# Patient Record
Sex: MALE | Race: WHITE | Employment: FULL TIME | URBAN - METROPOLITAN AREA
[De-identification: names, ages, dates, MRNs, and addresses within clinical notes are randomized per-mention and may not be internally consistent; named-entity substitution may affect disease eponyms.]

---

## 2020-11-29 ENCOUNTER — APPOINTMENT (OUTPATIENT)
Dept: GENERAL RADIOLOGY | Age: 40
End: 2020-11-29
Attending: EMERGENCY MEDICINE

## 2020-11-29 ENCOUNTER — HOSPITAL ENCOUNTER (EMERGENCY)
Age: 40
Discharge: HOME | End: 2020-11-29
Attending: EMERGENCY MEDICINE

## 2020-11-29 VITALS
SYSTOLIC BLOOD PRESSURE: 154 MMHG | OXYGEN SATURATION: 100 % | TEMPERATURE: 97.7 F | RESPIRATION RATE: 18 BRPM | DIASTOLIC BLOOD PRESSURE: 78 MMHG | HEIGHT: 67 IN | WEIGHT: 135 LBS | HEART RATE: 60 BPM | BODY MASS INDEX: 21.19 KG/M2

## 2020-11-29 DIAGNOSIS — S61.214A LACERATION OF RIGHT RING FINGER WITHOUT FOREIGN BODY WITHOUT DAMAGE TO NAIL, INITIAL ENCOUNTER: Primary | ICD-10-CM

## 2020-11-29 PROCEDURE — 74011000250 HC RX REV CODE- 250: Performed by: EMERGENCY MEDICINE

## 2020-11-29 PROCEDURE — 99283 EMERGENCY DEPT VISIT LOW MDM: CPT

## 2020-11-29 PROCEDURE — 73140 X-RAY EXAM OF FINGER(S): CPT

## 2020-11-29 PROCEDURE — 90715 TDAP VACCINE 7 YRS/> IM: CPT | Performed by: EMERGENCY MEDICINE

## 2020-11-29 PROCEDURE — 74011250636 HC RX REV CODE- 250/636: Performed by: EMERGENCY MEDICINE

## 2020-11-29 PROCEDURE — 75810000293 HC SIMP/SUPERF WND  RPR

## 2020-11-29 RX ORDER — BACITRACIN 500 UNIT/G
1 PACKET (EA) TOPICAL ONCE
Status: COMPLETED | OUTPATIENT
Start: 2020-11-29 | End: 2020-11-29

## 2020-11-29 RX ORDER — LIDOCAINE HYDROCHLORIDE 10 MG/ML
10 INJECTION INFILTRATION; PERINEURAL ONCE
Status: COMPLETED | OUTPATIENT
Start: 2020-11-29 | End: 2020-11-29

## 2020-11-29 RX ADMIN — BACITRACIN 1 PACKET: 500 OINTMENT TOPICAL at 18:24

## 2020-11-29 RX ADMIN — LIDOCAINE HYDROCHLORIDE 10 ML: 10 INJECTION, SOLUTION INFILTRATION; PERINEURAL at 16:49

## 2020-11-29 RX ADMIN — TETANUS TOXOID, REDUCED DIPHTHERIA TOXOID AND ACELLULAR PERTUSSIS VACCINE, ADSORBED 0.5 ML: 5; 2.5; 8; 8; 2.5 SUSPENSION INTRAMUSCULAR at 16:49

## 2020-11-29 NOTE — LETTER
[address] [place] [address] 
[phone] Work/School Note Date: 11/29/2020 To Whom It May concern: 
 
Marveen Brunner III was seen and treated today in the emergency room by the following provider(s): 
Attending Provider: Callie Yanes MD.   
 
Marveen Brunner III return to work 12-3-2020 Sincerely, 
 
 
 
 
Jesenia Bacon RN

## 2020-11-29 NOTE — ED TRIAGE NOTES
Occurred 20 minutes ago, cutting wood with skill saw, cut right tip of 4th and 3rd finger, no bleeding at present, stated tetanus out of date greater than 5 years. Pt can feel and move fingers.

## 2020-11-29 NOTE — ED PROVIDER NOTES
EMERGENCY DEPARTMENT HISTORY AND PHYSICAL EXAM      Date: 11/29/2020  Patient Name: Laverne Mueller III    History of Presenting Illness     Chief Complaint   Patient presents with    Finger Pain       History Provided By: Patient    HPI: Miko Nunez, 45 y.o. male with a past medical history significant No significant past medical history presents to the ED with cc of right 4th finger laceration. Patient reports cutting the distal aspect of his right fourth finger on a circular saw approximate 20 minutes prior to arrival.  Bleeding is controlled with pressure. He reports moderate pain. He denies numbness, tingling, or weakness. He denies any other injuries other than a very superficial break in the skin on the distal aspect of the third finger. There are no other complaints, changes, or physical findings at this time. PCP: None    No current facility-administered medications on file prior to encounter. No current outpatient medications on file prior to encounter. Past History     Past Medical History:  History reviewed. No pertinent past medical history. Past Surgical History:  History reviewed. No pertinent surgical history. Family History:  History reviewed. No pertinent family history. Social History:  Social History     Tobacco Use    Smoking status: Current Every Day Smoker     Packs/day: 0.50    Smokeless tobacco: Never Used   Substance Use Topics    Alcohol use: Yes     Alcohol/week: 1.0 standard drinks     Types: 1 Cans of beer per week     Comment: daily    Drug use: Never       Allergies:  No Known Allergies      Review of Systems   Gen: no fevers, no chills  Skin: laceration right 4th finger  Musc: right 4th finger pain, laceration  Neuro: no weakness, no numbness, no tingling  GI: no nause, no vomiting  Review of Systems    Physical Exam   Gen: WD, WN WM in NAD, Non-toxic appearing  Skin/Musc: approx.  2cm laceration to distal tuft of right 4th finger; no nail involvement. Approx. 5mm area of skin to lateral aspect of laceration. Bleeding controlled with pressure. No fb noted. FROM at PIP, DIP, MCP; DNVI  Neuro: A&O x 4, Normal speech  Psych: Normal mood, normal affect  Physical Exam    Diagnostic Study Results     Labs -   No results found for this or any previous visit (from the past 12 hour(s)). Radiologic Studies -   @lastxrresult@  CT Results  (Last 48 hours)    None        CXR Results  (Last 48 hours)    None            Medical Decision Making   I am the first provider for this patient. I reviewed the vital signs, available nursing notes, past medical history, past surgical history, family history and social history. Vital Signs-Reviewed the patient's vital signs. Patient Vitals for the past 12 hrs:   Temp Pulse Resp BP SpO2   11/29/20 1606 97.7 °F (36.5 °C) 60 18 (!) 154/78 100 %       Records Reviewed: Nursing Notes        Provider Notes (Medical Decision Making):     [facility]         ED Course:   Initial assessment performed. The patients presenting problems have been discussed, and they are in agreement with the care plan formulated and outlined with them. I have encouraged them to ask questions as they arise throughout their visit. PROCEDURES  Wound Repair    Date/Time: 11/29/2020 6:26 PM  Performed by: attendingPreparation: skin prepped with ChloraPrep and sterile field established  Pre-procedure re-eval: Immediately prior to the procedure, the patient was reevaluated and found suitable for the planned procedure and any planned medications. Time out: Immediately prior to the procedure a time out was called to verify the correct patient, procedure, equipment, staff and marking as appropriate. .  Location details: right ring finger  Wound length:2.5 cm or less  Anesthesia: digital block    Anesthesia:  Local Anesthetic: lidocaine 1% without epinephrine  Anesthetic total: 5 mL  Foreign bodies: no foreign bodies  Irrigation solution: saline  Irrigation method: syringe  Debridement: none  Skin closure: 4-0 nylon  Number of sutures: 8  Technique: simple and interrupted  Approximation: close  Dressing: antibiotic ointment, splint and tube gauze  Patient tolerance: Patient tolerated the procedure well with no immediate complications  My total time at bedside, performing this procedure was 16-30 minutes. Tetanus booster administered      PLAN:  1. There are no discharge medications for this patient. 2.   Follow-up Information     Follow up With Specialties Details Why Contact Info    [address] [place] [address] Emergency Medicine   [address] [place] [id number] [phone]        Return to ED if worse     Diagnosis     Clinical Impression:   1.  Laceration of right ring finger without foreign body without damage to nail, initial encounter

## 2020-11-29 NOTE — ED NOTES
Cleaned area of right 3rd and 4th fingers with safclens, placed bacitracin dressing and coban on 4th finger and bandaid on 3rd finger. Placed short splint on 4th finger and wrapped with coban. Pt tolerated well.   Wound care given and pt stated understanding

## 2020-11-29 NOTE — DISCHARGE INSTRUCTIONS
Keep the dressing on for the next 24 hours. Apply topical antibiotic ointment with a dressing at least for the first 3 to 4 days. Be sure to keep covered when working and do not let get soaked with water or sweat. Recommend ibuprofen 800 mg (4 over-the-counter tablets) every 8 hours as needed for pain. Return to the emergency department or to any urgent care or your primary care doctor's office in 10 days for removal of 8 stitches. Return to the emergency department with any signs or symptoms of infection to include redness, swelling, pus from the wound, pain with bending the finger, or any other worsening, new, or worrisome symptoms.

## 2022-05-12 ENCOUNTER — TRANSCRIBE ORDER (OUTPATIENT)
Dept: SCHEDULING | Age: 42
End: 2022-05-12

## 2022-05-12 DIAGNOSIS — M54.12 CERVICAL RADICULOPATHY: ICD-10-CM

## 2022-05-12 DIAGNOSIS — M54.2 NECK PAIN: ICD-10-CM

## 2022-05-12 DIAGNOSIS — M25.511 CHRONIC RIGHT SHOULDER PAIN: Primary | ICD-10-CM

## 2022-05-12 DIAGNOSIS — G89.29 CHRONIC RIGHT SHOULDER PAIN: Primary | ICD-10-CM

## 2023-04-21 DIAGNOSIS — G89.29 CHRONIC RIGHT SHOULDER PAIN: Primary | ICD-10-CM

## 2023-04-21 DIAGNOSIS — M54.12 CERVICAL RADICULOPATHY: ICD-10-CM

## 2023-04-21 DIAGNOSIS — M25.511 CHRONIC RIGHT SHOULDER PAIN: Primary | ICD-10-CM

## 2023-04-21 DIAGNOSIS — M54.2 NECK PAIN: ICD-10-CM
